# Patient Record
Sex: MALE | Race: WHITE | NOT HISPANIC OR LATINO | Employment: UNEMPLOYED | URBAN - METROPOLITAN AREA
[De-identification: names, ages, dates, MRNs, and addresses within clinical notes are randomized per-mention and may not be internally consistent; named-entity substitution may affect disease eponyms.]

---

## 2020-06-22 ENCOUNTER — OFFICE VISIT (OUTPATIENT)
Dept: CARDIOLOGY | Facility: CLINIC | Age: 70
End: 2020-06-22

## 2020-06-22 VITALS
DIASTOLIC BLOOD PRESSURE: 80 MMHG | BODY MASS INDEX: 27.06 KG/M2 | HEART RATE: 50 BPM | OXYGEN SATURATION: 98 % | SYSTOLIC BLOOD PRESSURE: 116 MMHG | RESPIRATION RATE: 16 BRPM | WEIGHT: 189 LBS | HEIGHT: 70 IN

## 2020-06-22 DIAGNOSIS — R06.09 EXERTIONAL DYSPNEA: Primary | ICD-10-CM

## 2020-06-22 PROCEDURE — 93000 ELECTROCARDIOGRAM COMPLETE: CPT | Performed by: INTERNAL MEDICINE

## 2020-06-22 PROCEDURE — 99204 OFFICE O/P NEW MOD 45 MIN: CPT | Performed by: INTERNAL MEDICINE

## 2020-06-22 RX ORDER — PANTOPRAZOLE SODIUM 40 MG/1
TABLET, DELAYED RELEASE ORAL
COMMUNITY
Start: 2020-06-13

## 2020-06-22 RX ORDER — TRAZODONE HYDROCHLORIDE 50 MG/1
TABLET ORAL
COMMUNITY
Start: 2020-06-13

## 2020-06-22 RX ORDER — RAMIPRIL 10 MG/1
CAPSULE ORAL
COMMUNITY
Start: 2020-04-04

## 2020-06-22 RX ORDER — METOPROLOL TARTRATE 50 MG/1
50 TABLET, FILM COATED ORAL 2 TIMES DAILY
COMMUNITY
Start: 2020-04-29

## 2020-06-22 RX ORDER — ATORVASTATIN CALCIUM 40 MG/1
TABLET, FILM COATED ORAL
COMMUNITY
Start: 2020-03-16

## 2020-06-22 RX ORDER — HYDROCHLOROTHIAZIDE 25 MG/1
25 TABLET ORAL DAILY
COMMUNITY

## 2020-06-22 RX ORDER — PAROXETINE HYDROCHLORIDE 40 MG/1
TABLET, FILM COATED ORAL
COMMUNITY
Start: 2020-06-13

## 2020-06-22 RX ORDER — ALPRAZOLAM 0.5 MG/1
TABLET ORAL
COMMUNITY
Start: 2020-06-03

## 2020-06-22 NOTE — PROGRESS NOTES
"        [facility]      Patient Name: Phil Gonsalves  :1950  Age: 69 y.o.  Encounter Provider:  Jose Siddiqui Jr, MD      Chief Complaint:   Chief Complaint   Patient presents with   • Coronary Artery Disease     NEW PATIENT         HPI  Phil Gonsalves is a 69 y.o. male with past medical history of coronary artery disease status post PCI in , hypertension hyperlipidemia who presents for initial evaluation of lower extremity edema.  He was initially seen by Dr. Chino in  for left arm pain which turned out to be his anginal equivalent.  He was found to have obstructive coronary artery disease and PCI performed to the \"right side\" per patient report.  He is done very well since that time and continues [place] very [place] doing a lot of walking and only recently having some issues.  He has been on diuretic therapy for many years due to lower extremity edema.  Is uncertain whether or not this is secondary to venous insufficiency but he was noted to have some renal dysfunction and was taken off of diuretics about 6 months ago.  He began to note significantly increased lower extremity edema after cessation of therapy.  He also noted some increase in exertional dyspnea when walking to the back of this form at that time.  He has since been placed back on diuretics and states that he recently had follow-up blood work which showed no renal dysfunction.  He also states that the exertional dyspnea has resolved since restarting therapy.  He denies any orthopnea or PND.  He has not been experiencing his anginal equivalent.  No dizziness, palpitations or syncope.  He is an ex-smoker who quit many years ago.  Family history was reviewed and is not pertinent to this clinic visit.      The following portions of the patient's history were reviewed and updated as appropriate: allergies, current medications, past family history, past medical history, past social history, past surgical " "history and problem list.      Review of Systems   Constitution: Negative for chills and fever.   HENT: Negative for hoarse voice and sore throat.    Eyes: Negative for double vision and photophobia.   Cardiovascular: Positive for dyspnea on exertion. Negative for chest pain, leg swelling, near-syncope, orthopnea, palpitations, paroxysmal nocturnal dyspnea and syncope.   Respiratory: Negative for cough and wheezing.    Skin: Negative for poor wound healing and rash.   Musculoskeletal: Negative for arthritis and joint swelling.   Gastrointestinal: Negative for bloating, abdominal pain, hematemesis and hematochezia.   Neurological: Negative for dizziness and focal weakness.   Psychiatric/Behavioral: Negative for depression and suicidal ideas.       OBJECTIVE:   Vital Signs  Vitals:    06/22/20 1111   BP: 116/80   Pulse: 50   Resp: 16   SpO2: 98%     Estimated body mass index is 27.12 kg/m² as calculated from the following:    Height as of this encounter: 177.8 cm (70\").    Weight as of this encounter: 85.7 kg (189 lb).    Physical Exam   Constitutional: He is oriented to person, place, and time. He appears well-developed and well-nourished.   HENT:   Head: Normocephalic and atraumatic.   Eyes: Pupils are equal, round, and reactive to light. Conjunctivae are normal.   Neck: No JVD present. No thyromegaly present.   Cardiovascular: Exam reveals no gallop and no friction rub.   No murmur heard.  Pulmonary/Chest: No respiratory distress. He exhibits no tenderness.   Abdominal: Bowel sounds are normal. He exhibits no distension.   Musculoskeletal: He exhibits no edema or tenderness.   Neurological: He is alert and oriented to person, place, and time.   Skin: No rash noted. No erythema.   Psychiatric: He has a normal mood and affect. Judgment normal.   Vitals reviewed.        ECG 12 Lead  Date/Time: 6/22/2020 11:40 AM  Performed by: Jose Siddiqui Jr., MD  Authorized by: Jose Siddiqui Jr., MD   Comparison: not compared " with previous ECG   Previous ECG: no previous ECG available  Rhythm: sinus bradycardia    Clinical impression: abnormal EKG                  ASSESSMENT:     Exertional dyspnea  Bilateral lower extremity edema  Bradycardia  CAD  Hypertension  Dyslipidemia    PLAN OF CARE:     1. Lower extremity edema -patient did have concurrent exertional dyspnea which has since resolved.  We will check an echocardiogram.  He is not having his anginal equivalent and so I do not think that further ischemic work-up is needed at this time.  Bradycardia is noted but the patient states that he is known about a low heart rate for his entire life.  Other than the slow ventricular response I see no abnormalities on EKG and the patient has no clinical complaints that would be consistent with bradyarrhythmia.  Continue hydrochlorothiazide.  2. Hypertension -patient shows me a very well controlled home blood pressure log.  Continue same.  Patient is compliant with sodium restriction.  3. Bradycardia -sinus focus.  Patient is on moderate dose of beta-blocker but seems unaffected by this.  We will continue to monitor clinical progress.  4. Dyslipidemia -continue statin.  5. CAD -continue aspirin and statin.    Return to clinic 3 months           Discharge Medications           Accurate as of June 22, 2020 11:37 AM. If you have any questions, ask your nurse or doctor.               Continue These Medications      Instructions Start Date   ALPRAZolam 0.5 MG tablet  Commonly known as:  XANAX   No dose, route, or frequency recorded.      aspirin 325 MG EC tablet   325 mg, Oral, Daily      atorvastatin 40 MG tablet  Commonly known as:  LIPITOR   No dose, route, or frequency recorded.      hydroCHLOROthiazide 25 MG tablet  Commonly known as:  HYDRODIURIL   25 mg, Oral, Daily      metoprolol tartrate 50 MG tablet  Commonly known as:  LOPRESSOR   50 tablets, Oral, 2 times daily      pantoprazole 40 MG EC tablet  Commonly known as:  PROTONIX   No dose,  route, or frequency recorded.      PARoxetine 40 MG tablet  Commonly known as:  PAXIL   No dose, route, or frequency recorded.      ramipril 10 MG capsule  Commonly known as:  ALTACE   No dose, route, or frequency recorded.      traZODone 50 MG tablet  Commonly known as:  DESYREL   No dose, route, or frequency recorded.             Thank you for allowing me to participate in the care of your patient,      Sincerely,   Jose Siddiqui Jr, MD  [facility]  06/22/20  11:37

## 2020-07-06 ENCOUNTER — HOSPITAL ENCOUNTER (OUTPATIENT)
Dept: CARDIOLOGY | Facility: HOSPITAL | Age: 70
Discharge: HOME | End: 2020-07-06
Admitting: INTERNAL MEDICINE

## 2020-07-06 VITALS
HEIGHT: 70 IN | WEIGHT: 180 LBS | HEART RATE: 48 BPM | BODY MASS INDEX: 25.77 KG/M2 | SYSTOLIC BLOOD PRESSURE: 116 MMHG | DIASTOLIC BLOOD PRESSURE: 80 MMHG

## 2020-07-06 DIAGNOSIS — R06.09 EXERTIONAL DYSPNEA: ICD-10-CM

## 2020-07-06 PROCEDURE — 93306 TTE W/DOPPLER COMPLETE: CPT

## 2020-07-06 PROCEDURE — 93306 TTE W/DOPPLER COMPLETE: CPT | Performed by: INTERNAL MEDICINE

## 2020-07-07 ENCOUNTER — TELEPHONE (OUTPATIENT)
Dept: CARDIOLOGY | Facility: CLINIC | Age: 70
End: 2020-07-07

## 2020-07-07 LAB
AORTIC ARCH: 2.2 CM
ASCENDING AORTA: 3.5 CM
BH CV ECHO MEAS - ACS: 1.8 CM
BH CV ECHO MEAS - AO MAX PG (FULL): 4.6 MMHG
BH CV ECHO MEAS - AO MAX PG: 6.4 MMHG
BH CV ECHO MEAS - AO MEAN PG (FULL): 3 MMHG
BH CV ECHO MEAS - AO MEAN PG: 4 MMHG
BH CV ECHO MEAS - AO V2 MAX: 126 CM/SEC
BH CV ECHO MEAS - AO V2 MEAN: 94.3 CM/SEC
BH CV ECHO MEAS - AO V2 VTI: 35.8 CM
BH CV ECHO MEAS - ASC AORTA: 3.5 CM
BH CV ECHO MEAS - AVA(I,A): 1.5 CM^2
BH CV ECHO MEAS - AVA(I,D): 1.5 CM^2
BH CV ECHO MEAS - AVA(V,A): 1.6 CM^2
BH CV ECHO MEAS - AVA(V,D): 1.6 CM^2
BH CV ECHO MEAS - BSA(HAYCOCK): 2 M^2
BH CV ECHO MEAS - BSA: 2 M^2
BH CV ECHO MEAS - BZI_BMI: 25.8 KILOGRAMS/M^2
BH CV ECHO MEAS - BZI_METRIC_HEIGHT: 177.8 CM
BH CV ECHO MEAS - BZI_METRIC_WEIGHT: 81.6 KG
BH CV ECHO MEAS - EDV(MOD-SP2): 77 ML
BH CV ECHO MEAS - EDV(MOD-SP4): 81 ML
BH CV ECHO MEAS - EDV(TEICH): 97.3 ML
BH CV ECHO MEAS - EF(CUBED): 66.3 %
BH CV ECHO MEAS - EF(MOD-BP): 61 %
BH CV ECHO MEAS - EF(MOD-SP2): 55.8 %
BH CV ECHO MEAS - EF(MOD-SP4): 65.4 %
BH CV ECHO MEAS - EF(TEICH): 57.9 %
BH CV ECHO MEAS - ESV(MOD-SP2): 34 ML
BH CV ECHO MEAS - ESV(MOD-SP4): 28 ML
BH CV ECHO MEAS - ESV(TEICH): 41 ML
BH CV ECHO MEAS - FS: 30.4 %
BH CV ECHO MEAS - IVS/LVPW: 1.1
BH CV ECHO MEAS - IVSD: 1.1 CM
BH CV ECHO MEAS - LAT PEAK E' VEL: 7.9 CM/SEC
BH CV ECHO MEAS - LV DIASTOLIC VOL/BSA (35-75): 40.6 ML/M^2
BH CV ECHO MEAS - LV MASS(C)D: 169.9 GRAMS
BH CV ECHO MEAS - LV MASS(C)DI: 85.1 GRAMS/M^2
BH CV ECHO MEAS - LV MAX PG: 1.7 MMHG
BH CV ECHO MEAS - LV MEAN PG: 1 MMHG
BH CV ECHO MEAS - LV SYSTOLIC VOL/BSA (12-30): 14 ML/M^2
BH CV ECHO MEAS - LV V1 MAX: 65.5 CM/SEC
BH CV ECHO MEAS - LV V1 MEAN: 43.6 CM/SEC
BH CV ECHO MEAS - LV V1 VTI: 16.8 CM
BH CV ECHO MEAS - LVIDD: 4.6 CM
BH CV ECHO MEAS - LVIDS: 3.2 CM
BH CV ECHO MEAS - LVLD AP2: 8 CM
BH CV ECHO MEAS - LVLD AP4: 8 CM
BH CV ECHO MEAS - LVLS AP2: 7 CM
BH CV ECHO MEAS - LVLS AP4: 6.4 CM
BH CV ECHO MEAS - LVOT AREA (M): 3.1 CM^2
BH CV ECHO MEAS - LVOT AREA: 3.1 CM^2
BH CV ECHO MEAS - LVOT DIAM: 2 CM
BH CV ECHO MEAS - LVPWD: 1 CM
BH CV ECHO MEAS - MED PEAK E' VEL: 7.3 CM/SEC
BH CV ECHO MEAS - MR MAX PG: 82.4 MMHG
BH CV ECHO MEAS - MR MAX VEL: 454 CM/SEC
BH CV ECHO MEAS - MV A DUR: 0.13 SEC
BH CV ECHO MEAS - MV A MAX VEL: 76.4 CM/SEC
BH CV ECHO MEAS - MV DEC SLOPE: 262 CM/SEC^2
BH CV ECHO MEAS - MV DEC TIME: 0.25 SEC
BH CV ECHO MEAS - MV E MAX VEL: 69.8 CM/SEC
BH CV ECHO MEAS - MV E/A: 0.91
BH CV ECHO MEAS - MV MAX PG: 4.8 MMHG
BH CV ECHO MEAS - MV MEAN PG: 1 MMHG
BH CV ECHO MEAS - MV P1/2T MAX VEL: 68.9 CM/SEC
BH CV ECHO MEAS - MV P1/2T: 77 MSEC
BH CV ECHO MEAS - MV V2 MAX: 109 CM/SEC
BH CV ECHO MEAS - MV V2 MEAN: 43 CM/SEC
BH CV ECHO MEAS - MV V2 VTI: 37.3 CM
BH CV ECHO MEAS - MVA P1/2T LCG: 3.2 CM^2
BH CV ECHO MEAS - MVA(P1/2T): 2.9 CM^2
BH CV ECHO MEAS - MVA(VTI): 1.4 CM^2
BH CV ECHO MEAS - PA MAX PG (FULL): 2.5 MMHG
BH CV ECHO MEAS - PA MAX PG: 3.2 MMHG
BH CV ECHO MEAS - PA V2 MAX: 89.6 CM/SEC
BH CV ECHO MEAS - PULM A REVS DUR: 0.12 SEC
BH CV ECHO MEAS - PULM A REVS VEL: 23.4 CM/SEC
BH CV ECHO MEAS - PULM DIAS VEL: 25.7 CM/SEC
BH CV ECHO MEAS - PULM S/D: 1.4
BH CV ECHO MEAS - PULM SYS VEL: 37.2 CM/SEC
BH CV ECHO MEAS - PVA(V,A): 1.8 CM^2
BH CV ECHO MEAS - PVA(V,D): 1.8 CM^2
BH CV ECHO MEAS - QP/QS: 0.95
BH CV ECHO MEAS - RV BASE (<4.1) - OBSOLETE: 3.3 CM
BH CV ECHO MEAS - RV LENGTH (<8.5) - OBSOLETE: 7 CM
BH CV ECHO MEAS - RV MAX PG: 0.7 MMHG
BH CV ECHO MEAS - RV MEAN PG: 0 MMHG
BH CV ECHO MEAS - RV V1 MAX: 41.8 CM/SEC
BH CV ECHO MEAS - RV V1 MEAN: 30.8 CM/SEC
BH CV ECHO MEAS - RV V1 VTI: 13.2 CM
BH CV ECHO MEAS - RVOT AREA: 3.8 CM^2
BH CV ECHO MEAS - RVOT DIAM: 2.2 CM
BH CV ECHO MEAS - SI(CUBED): 32.3 ML/M^2
BH CV ECHO MEAS - SI(LVOT): 26.4 ML/M^2
BH CV ECHO MEAS - SI(MOD-SP2): 21.5 ML/M^2
BH CV ECHO MEAS - SI(MOD-SP4): 26.6 ML/M^2
BH CV ECHO MEAS - SI(TEICH): 28.2 ML/M^2
BH CV ECHO MEAS - SUP REN AO DIAM: 2 CM
BH CV ECHO MEAS - SV(CUBED): 64.6 ML
BH CV ECHO MEAS - SV(LVOT): 52.8 ML
BH CV ECHO MEAS - SV(MOD-SP2): 43 ML
BH CV ECHO MEAS - SV(MOD-SP4): 53 ML
BH CV ECHO MEAS - SV(RVOT): 50.2 ML
BH CV ECHO MEAS - SV(TEICH): 56.4 ML
BH CV ECHO MEAS - TAPSE (>1.6): 2.3 CM2
BH CV ECHO MEASUREMENTS AVERAGE E/E' RATIO: 9.18
BH CV XLRA - RV BASE: 3.3 CM
BH CV XLRA - RV LENGTH: 7 CM
BH CV XLRA - RV MID: 3.2 CM
BH CV XLRA - TDI S': 12 CM/SEC
LEFT ATRIUM VOLUME INDEX: 28 ML/M2
MAXIMAL PREDICTED HEART RATE: 151 BPM
SINUS: 3.6 CM
STJ: 2.9 CM
STRESS TARGET HR: 128 BPM

## 2020-07-07 NOTE — PROGRESS NOTES
Please let patient know that he has normal heart pump function and that I will see him at next scheduled clinic visit.

## 2020-07-07 NOTE — TELEPHONE ENCOUNTER
----- Message from Jose Siddiqui Jr., MD sent at 7/7/2020 10:12 AM EDT -----  Please let patient know that he has normal heart pump function and that I will see him at next scheduled clinic visit.

## 2021-03-09 ENCOUNTER — TELEPHONE (OUTPATIENT)
Dept: ONCOLOGY | Facility: CLINIC | Age: 71
End: 2021-03-09

## 2021-03-09 ENCOUNTER — CONSULT (OUTPATIENT)
Dept: ONCOLOGY | Facility: CLINIC | Age: 71
End: 2021-03-09

## 2021-03-09 ENCOUNTER — LAB (OUTPATIENT)
Dept: OTHER | Facility: HOSPITAL | Age: 71
End: 2021-03-09

## 2021-03-09 VITALS
TEMPERATURE: 97.3 F | OXYGEN SATURATION: 100 % | WEIGHT: 190.4 LBS | BODY MASS INDEX: 28.85 KG/M2 | DIASTOLIC BLOOD PRESSURE: 68 MMHG | RESPIRATION RATE: 16 BRPM | SYSTOLIC BLOOD PRESSURE: 106 MMHG | HEART RATE: 53 BPM | HEIGHT: 68 IN

## 2021-03-09 DIAGNOSIS — D64.9 ANEMIA, UNSPECIFIED TYPE: ICD-10-CM

## 2021-03-09 DIAGNOSIS — I82.401 ACUTE EMBOLISM AND THROMBOSIS OF UNSPECIFIED DEEP VEINS OF RIGHT LOWER EXTREMITY (HCC): Primary | ICD-10-CM

## 2021-03-09 DIAGNOSIS — I82.431 ACUTE DEEP VEIN THROMBOSIS (DVT) OF POPLITEAL VEIN OF RIGHT LOWER EXTREMITY (HCC): Primary | ICD-10-CM

## 2021-03-09 LAB
ABO GROUP BLD: NORMAL
ALBUMIN SERPL-MCNC: 4.4 G/DL (ref 3.5–5.2)
ALBUMIN/GLOB SERPL: 1.6 G/DL
ALP SERPL-CCNC: 79 U/L (ref 39–117)
ALT SERPL W P-5'-P-CCNC: 17 U/L (ref 1–41)
ANION GAP SERPL CALCULATED.3IONS-SCNC: 4.2 MMOL/L (ref 5–15)
AST SERPL-CCNC: 19 U/L (ref 1–40)
BASOPHILS # BLD AUTO: 0.03 10*3/MM3 (ref 0–0.2)
BASOPHILS NFR BLD AUTO: 0.5 % (ref 0–1.5)
BILIRUB SERPL-MCNC: 0.7 MG/DL (ref 0–1.2)
BUN SERPL-MCNC: 36 MG/DL (ref 8–23)
BUN/CREAT SERPL: 25.9 (ref 7–25)
CALCIUM SPEC-SCNC: 9.8 MG/DL (ref 8.6–10.5)
CHLORIDE SERPL-SCNC: 100 MMOL/L (ref 98–107)
CO2 SERPL-SCNC: 30.8 MMOL/L (ref 22–29)
CREAT SERPL-MCNC: 1.39 MG/DL (ref 0.76–1.27)
DAT POLY-SP REAG RBC QL: NEGATIVE
DEPRECATED RDW RBC AUTO: 46.1 FL (ref 37–54)
EOSINOPHIL # BLD AUTO: 0.17 10*3/MM3 (ref 0–0.4)
EOSINOPHIL NFR BLD AUTO: 2.6 % (ref 0.3–6.2)
ERYTHROCYTE [DISTWIDTH] IN BLOOD BY AUTOMATED COUNT: 13.8 % (ref 12.3–15.4)
FERRITIN SERPL-MCNC: 78.1 NG/ML (ref 30–400)
GFR SERPL CREATININE-BSD FRML MDRD: 51 ML/MIN/1.73
GLOBULIN UR ELPH-MCNC: 2.7 GM/DL
GLUCOSE SERPL-MCNC: 107 MG/DL (ref 65–99)
HAPTOGLOB SERPL-MCNC: 131 MG/DL (ref 30–200)
HCT VFR BLD AUTO: 36.9 % (ref 37.5–51)
HGB BLD-MCNC: 12.5 G/DL (ref 13–17.7)
HGB RETIC QN AUTO: 34.3 PG (ref 29.8–36.1)
IMM GRANULOCYTES # BLD AUTO: 0.03 10*3/MM3 (ref 0–0.05)
IMM GRANULOCYTES NFR BLD AUTO: 0.5 % (ref 0–0.5)
IMM RETICS NFR: 14.2 % (ref 3–15.8)
IRON 24H UR-MRATE: 112 MCG/DL (ref 59–158)
IRON SATN MFR SERPL: 28 % (ref 20–50)
LDH SERPL-CCNC: 223 U/L (ref 135–225)
LYMPHOCYTES # BLD AUTO: 2.07 10*3/MM3 (ref 0.7–3.1)
LYMPHOCYTES NFR BLD AUTO: 31.1 % (ref 19.6–45.3)
MCH RBC QN AUTO: 30.7 PG (ref 26.6–33)
MCHC RBC AUTO-ENTMCNC: 33.9 G/DL (ref 31.5–35.7)
MCV RBC AUTO: 90.7 FL (ref 79–97)
MONOCYTES # BLD AUTO: 0.86 10*3/MM3 (ref 0.1–0.9)
MONOCYTES NFR BLD AUTO: 12.9 % (ref 5–12)
NEUTROPHILS NFR BLD AUTO: 3.49 10*3/MM3 (ref 1.7–7)
NEUTROPHILS NFR BLD AUTO: 52.4 % (ref 42.7–76)
NRBC BLD AUTO-RTO: 0 /100 WBC (ref 0–0.2)
PLATELET # BLD AUTO: 190 10*3/MM3 (ref 140–450)
PMV BLD AUTO: 10.1 FL (ref 6–12)
POTASSIUM SERPL-SCNC: 4.2 MMOL/L (ref 3.5–5.2)
PROT SERPL-MCNC: 7.1 G/DL (ref 6–8.5)
RBC # BLD AUTO: 4.07 10*6/MM3 (ref 4.14–5.8)
RETICS # AUTO: 0.06 10*6/MM3 (ref 0.02–0.13)
RETICS/RBC NFR AUTO: 1.55 % (ref 0.7–1.9)
RH BLD: POSITIVE
SODIUM SERPL-SCNC: 135 MMOL/L (ref 136–145)
TIBC SERPL-MCNC: 399 MCG/DL (ref 298–536)
TRANSFERRIN SERPL-MCNC: 268 MG/DL (ref 200–360)
VIT B12 BLD-MCNC: 1160 PG/ML (ref 211–946)
WBC # BLD AUTO: 6.65 10*3/MM3 (ref 3.4–10.8)

## 2021-03-09 PROCEDURE — 36415 COLL VENOUS BLD VENIPUNCTURE: CPT

## 2021-03-09 PROCEDURE — 80053 COMPREHEN METABOLIC PANEL: CPT | Performed by: INTERNAL MEDICINE

## 2021-03-09 PROCEDURE — 86900 BLOOD TYPING SEROLOGIC ABO: CPT

## 2021-03-09 PROCEDURE — 82607 VITAMIN B-12: CPT | Performed by: INTERNAL MEDICINE

## 2021-03-09 PROCEDURE — 83615 LACTATE (LD) (LDH) ENZYME: CPT | Performed by: INTERNAL MEDICINE

## 2021-03-09 PROCEDURE — 82728 ASSAY OF FERRITIN: CPT | Performed by: INTERNAL MEDICINE

## 2021-03-09 PROCEDURE — 85046 RETICYTE/HGB CONCENTRATE: CPT | Performed by: INTERNAL MEDICINE

## 2021-03-09 PROCEDURE — 99204 OFFICE O/P NEW MOD 45 MIN: CPT | Performed by: INTERNAL MEDICINE

## 2021-03-09 PROCEDURE — 85306 CLOT INHIBIT PROT S FREE: CPT | Performed by: INTERNAL MEDICINE

## 2021-03-09 PROCEDURE — 85014 HEMATOCRIT: CPT | Performed by: INTERNAL MEDICINE

## 2021-03-09 PROCEDURE — 86880 COOMBS TEST DIRECT: CPT | Performed by: INTERNAL MEDICINE

## 2021-03-09 PROCEDURE — 85025 COMPLETE CBC W/AUTO DIFF WBC: CPT | Performed by: INTERNAL MEDICINE

## 2021-03-09 PROCEDURE — 84466 ASSAY OF TRANSFERRIN: CPT | Performed by: INTERNAL MEDICINE

## 2021-03-09 PROCEDURE — 82747 ASSAY OF FOLIC ACID RBC: CPT | Performed by: INTERNAL MEDICINE

## 2021-03-09 PROCEDURE — 83540 ASSAY OF IRON: CPT | Performed by: INTERNAL MEDICINE

## 2021-03-09 PROCEDURE — 83010 ASSAY OF HAPTOGLOBIN QUANT: CPT | Performed by: INTERNAL MEDICINE

## 2021-03-09 PROCEDURE — 86901 BLOOD TYPING SEROLOGIC RH(D): CPT

## 2021-03-09 RX ORDER — RIVAROXABAN 20 MG/1
TABLET, FILM COATED ORAL
COMMUNITY
Start: 2021-02-09

## 2021-03-09 NOTE — PROGRESS NOTES
.     REASON FOR CONSULTATION:   PE  Provide an opinion on any further workup or treatment                             REQUESTING PHYSICIAN: Joel Hanson, *  RECORDS OBTAINED:  Records of the patient's history including those obtained from the referring provider were reviewed and summarized in detail.    HISTORY OF PRESENT ILLNESS:  The patient is a 70 y.o. year old male  who is here for follow-up with the above-mentioned history.    Last cardiology note reviewed 6/22/2020.  That was a new patient consult for coronary artery disease and lower extremity edema.    Reviewed last note from PCP, Dr. Joel Haynes, 2/9/2021.  Patient presented with ankle swelling and pain, left ankle, x1 week.  No known preceding events.    Right popliteal, peroneal, posterior tibial DVT, 2/9/2021, Tomas.  On 2/15/2021, unremarkable: Protein C activity, protein S activity, von Willebrand antigen, prothrombin gene mutation, Antithrombin, fibrinogen, lupus anticoagulant, factor V Leiden gene mutation, beta-2 glycoprotein antibodies, anticardiolipin antibodies    Denies any prior history of clotting other than a superficial thrombophlebitis in the contralateral leg back in 2013.  No family history of clotting.    States he is up-to-date on colonoscopy's.  States he has not been having prostate exams.  I told him to discuss with his PCP the pros and cons of prostate cancer screening.  I told him malignancies can be associated with clotting.    No significant bleeding problems since being on Xarelto were previously.    States he has not been doing much this winter with the pandemic.  States he has often been sitting for 2 or 3 hours at a time.  States he realizes this is a risk factor for clotting and plans to be more mobile.  His goal is to get off anticoagulation.  Already on aspirin 325 mg daily.  Has a coronary stent.    Past Medical History:   Diagnosis Date   • Anxiety    • Chronic kidney disease    • Coronary artery  disease    • Depression    • Headache    • History of anemia    • History of bradycardia    • Hyperglycemia    • Hyperlipidemia    • Hypertension    • Insomnia    • Malaise      Past Surgical History:   Procedure Laterality Date   • CARDIAC CATHETERIZATION     • CARDIAC SURGERY      PCI   • CAROTID STENT  2007    X 1       MEDICATIONS    Current Outpatient Medications:   •  ALPRAZolam (XANAX) 0.5 MG tablet, , Disp: , Rfl:   •  aspirin 325 MG EC tablet, Take 325 mg by mouth Daily., Disp: , Rfl:   •  atorvastatin (LIPITOR) 40 MG tablet, , Disp: , Rfl:   •  hydroCHLOROthiazide (HYDRODIURIL) 25 MG tablet, Take 25 mg by mouth Daily., Disp: , Rfl:   •  metoprolol tartrate (LOPRESSOR) 50 MG tablet, Take 50 tablets by mouth 2 (two) times a day., Disp: , Rfl:   •  pantoprazole (PROTONIX) 40 MG EC tablet, , Disp: , Rfl:   •  PARoxetine (PAXIL) 40 MG tablet, , Disp: , Rfl:   •  ramipril (ALTACE) 10 MG capsule, , Disp: , Rfl:   •  traZODone (DESYREL) 50 MG tablet, , Disp: , Rfl:   •  Xarelto 20 MG tablet, , Disp: , Rfl:     ALLERGIES:   No Known Allergies    SOCIAL HISTORY:       Social History     Socioeconomic History   • Marital status:      Spouse name: Sandra   • Number of children: Not on file   • Years of education: Not on file   • Highest education level: Not on file   Tobacco Use   • Smoking status: Former Smoker     Packs/day: 1.50     Years: 20.00     Pack years: 30.00     Types: Cigarettes     Quit date:      Years since quittin.1   • Smokeless tobacco: Never Used   Substance and Sexual Activity   • Alcohol use: Never   • Sexual activity: Defer         FAMILY HISTORY:  Family History   Problem Relation Age of Onset   • Heart attack Father    • Heart disease Father    • Hyperlipidemia Father    • Hypertension Father    • Heart disease Sister    • Hyperlipidemia Sister    • Hypertension Sister    • Heart disease Brother    • Hyperlipidemia Brother    • Hypertension Brother        REVIEW OF  "SYSTEMS:  Review of Systems   Constitutional: Negative for activity change.   HENT: Negative for nosebleeds and trouble swallowing.    Respiratory: Negative for shortness of breath and wheezing.    Cardiovascular: Negative for chest pain and palpitations.   Gastrointestinal: Negative for constipation, diarrhea and nausea.   Genitourinary: Negative for dysuria and hematuria.   Musculoskeletal: Negative for arthralgias and myalgias.   Neurological: Negative for seizures and syncope.   Hematological: Negative for adenopathy. Does not bruise/bleed easily.   Psychiatric/Behavioral: Negative for confusion.              Vitals:    03/09/21 1334   BP: 106/68   Pulse: 53   Resp: 16   Temp: 97.3 °F (36.3 °C)   TempSrc: Skin   SpO2: 100%   Weight: 86.4 kg (190 lb 6.4 oz)  Comment: new weight   Height: 173.5 cm (68.31\")  Comment: new height   PainSc: 0-No pain     No flowsheet data found.   PHYSICAL EXAM:        CONSTITUTIONAL:  Vital signs reviewed.  No distress, looks comfortable.  EYES:  Conjunctiva and lids unremarkable.  PERRLA  EARS,NOSE,MOUTH,THROAT:  Ears and nose appear unremarkable.  Lips, teeth, gums appear unremarkable.  RESPIRATORY:  Normal respiratory effort.  Lungs clear to auscultation bilaterally.  CARDIOVASCULAR:  Normal S1, S2.  No murmurs rubs or gallops.  No significant lower extremity edema.  GASTROINTESTINAL: Abdomen appears unremarkable.  Nontender.  No hepatomegaly.  No splenomegaly.  LYMPHATIC:  No cervical, supraclavicular, axillary lymphadenopathy.  SKIN:  Warm.  No rashes.  PSYCHIATRIC:  Normal judgment and insight.  Normal mood and affect.         RECENT LABS:        WBC   Date Value Ref Range Status   03/09/2021 6.65 3.40 - 10.80 10*3/mm3 Final   01/16/2016 5.95 4.50 - 10.70 K/Cumm Final     Hemoglobin   Date Value Ref Range Status   03/09/2021 12.5 (L) 13.0 - 17.7 g/dL Final   01/16/2016 13.5 (L) 13.7 - 17.6 g/dL Final     Platelets   Date Value Ref Range Status   03/09/2021 190 140 - 450 " 10*3/mm3 Final   01/16/2016 187 140 - 500 K/Cumm Final       Assessment/Plan   Acute deep vein thrombosis (DVT) of popliteal vein of right lower extremity (CMS/HCC)  - Protein S Antigen, Free  - Ferritin  - Iron Profile  - Retic With IRF & RET-He  - Vitamin B12  - Folate RBC  - Comprehensive Metabolic Panel  - Lactate Dehydrogenase  - Haptoglobin  - Direct Antiglobulin Test (Direct Nini)    Anemia, unspecified type  - Protein S Antigen, Free  - Ferritin  - Iron Profile  - Retic With IRF & RET-He  - Vitamin B12  - Folate RBC  - Comprehensive Metabolic Panel  - Lactate Dehydrogenase  - Haptoglobin  - Direct Antiglobulin Test (Direct Nini)        Phil Gonsalves   *Right popliteal, peroneal, posterior tibial DVT, 2/9/2021, [facility].    *Risk factors for clotting:  · On 2/15/2021, unremarkable: Protein C activity, protein S activity, prothrombin gene mutation, Antithrombin, fibrinogen, lupus anticoagulant, factor V Leiden gene mutation, beta-2 glycoprotein antibodies, anticardiolipin antibodies  · Was sitting for 2 to 3 hours at a time.  States this was due to combination of wintertime and viral pandemic.  · No recent trips or surgeries or hospitalizations before the DVT  · Quit smoking 2005  · No family history or prior personal history of DVT or PE.    *Anticoagulation  · Xarelto  · His goal is to get off Xarelto after 6 months of anticoagulation, if pending protein S free is unremarkable.  · (Already on aspirin 325 mg daily due to coronary stent)    *Anemia  Check anemia labs    *Cancer screening.  · Colonoscopy 2016.  States he was told next is due 10 years later.  · States he has not been having prostate cancer screening.  · I told him sometimes malignancies are associated with clotting.  I told him to discuss the pros and cons of prostate cancer screening with PCP    Plan  Anemia labs  Protein S free  MD CBC 2 to 4 weeks    Addendum  Please call him and tell him his kidney function is slightly abnormal.  Tell  him to focus on drinking plenty of noncaffeinated beverages and ask him to have a repeat BMP with us on Friday.  (I did not have enough old labs to establish his baseline).  Please send this lab to his PCP office    Note  Xarelto is considered reasonable if creatinine clearance is above 30.

## 2021-03-09 NOTE — TELEPHONE ENCOUNTER
Called the patient to let him know that per Dr. Mckeon his creatinine was slightly elevated and that he wanted him to come back in Friday for a stat BMP. Patient was agreeable and asked for 2pm. Scheduling was notified of the patient request. Lab was sent to his PCP.

## 2021-03-11 LAB
FOLATE BLD-MCNC: 583 NG/ML
FOLATE RBC-MCNC: 1576 NG/ML
HCT VFR BLD AUTO: 37 % (ref 37.5–51)

## 2021-03-12 ENCOUNTER — DOCUMENTATION (OUTPATIENT)
Dept: ONCOLOGY | Facility: CLINIC | Age: 71
End: 2021-03-12

## 2021-03-12 ENCOUNTER — CLINICAL SUPPORT (OUTPATIENT)
Dept: ONCOLOGY | Facility: HOSPITAL | Age: 71
End: 2021-03-12

## 2021-03-12 ENCOUNTER — LAB (OUTPATIENT)
Dept: OTHER | Facility: HOSPITAL | Age: 71
End: 2021-03-12

## 2021-03-12 VITALS
RESPIRATION RATE: 18 BRPM | WEIGHT: 190 LBS | HEIGHT: 70 IN | OXYGEN SATURATION: 100 % | SYSTOLIC BLOOD PRESSURE: 108 MMHG | TEMPERATURE: 97.4 F | DIASTOLIC BLOOD PRESSURE: 70 MMHG | BODY MASS INDEX: 27.2 KG/M2 | HEART RATE: 46 BPM

## 2021-03-12 DIAGNOSIS — D64.9 ANEMIA, UNSPECIFIED TYPE: ICD-10-CM

## 2021-03-12 LAB
ANION GAP SERPL CALCULATED.3IONS-SCNC: 3.7 MMOL/L (ref 5–15)
BUN SERPL-MCNC: 28 MG/DL (ref 8–23)
BUN/CREAT SERPL: 21.5 (ref 7–25)
CALCIUM SPEC-SCNC: 9.1 MG/DL (ref 8.6–10.5)
CHLORIDE SERPL-SCNC: 99 MMOL/L (ref 98–107)
CO2 SERPL-SCNC: 32.3 MMOL/L (ref 22–29)
CREAT SERPL-MCNC: 1.3 MG/DL (ref 0.76–1.27)
GFR SERPL CREATININE-BSD FRML MDRD: 55 ML/MIN/1.73
GLUCOSE SERPL-MCNC: 103 MG/DL (ref 65–99)
POTASSIUM SERPL-SCNC: 4.1 MMOL/L (ref 3.5–5.2)
PROT S FREE PPP-ACNC: 94 % (ref 49–138)
SODIUM SERPL-SCNC: 135 MMOL/L (ref 136–145)

## 2021-03-12 PROCEDURE — 80048 BASIC METABOLIC PNL TOTAL CA: CPT | Performed by: INTERNAL MEDICINE

## 2021-03-12 PROCEDURE — 36415 COLL VENOUS BLD VENIPUNCTURE: CPT

## 2021-03-12 PROCEDURE — G0463 HOSPITAL OUTPT CLINIC VISIT: HCPCS

## 2021-03-12 NOTE — NURSING NOTE
Lab Results   Component Value Date    GLUCOSE 103 (H) 03/12/2021    CALCIUM 9.1 03/12/2021     (L) 03/12/2021    K 4.1 03/12/2021    CO2 32.3 (H) 03/12/2021    CL 99 03/12/2021    BUN 28 (H) 03/12/2021    CREATININE 1.30 (H) 03/12/2021    EGFRIFNONA 55 (L) 03/12/2021    BCR 21.5 03/12/2021    ANIONGAP 3.7 (L) 03/12/2021     Patient here today for BMP lab review per Dr. Mckeon's orders. Patient with no complaints or concerns today. Cr 1.3, reviewed with Dr. Mckeon. Per MD patient to follow up with PCP regarding Cr. Reviewed lab results and MDs orders with patient who verbalized understanding. Follow up appointments reviewed with patient. Pt instructed to contact office for any questions or concerns, understanding verbalized.

## 2021-03-12 NOTE — PROGRESS NOTES
Creatinine a little better, 1.3.  I asked the nurses to send this to patient's PCP and to tell the patient about the slight improvement but the fact that it remains slightly abnormal and that he should discuss with his PCP.  No other recent labs for comparison other than the creatinine that prompted this repeat creatinine.

## 2021-03-24 NOTE — PROGRESS NOTES
.     REASON FOR FOLLOWUP :   PE and anemia    HISTORY OF PRESENT ILLNESS:  The patient is a 70 y.o. year old male  who is here for follow-up with the above-mentioned history.    No new problems since last visit.  Denies  chest pain, SOA.  Denies bleeding other than left-sided epistaxis which has been persistent for 3 to 4 months    Past Medical History:   Diagnosis Date   • Anxiety    • Chronic kidney disease    • Coronary artery disease    • Depression    • Headache    • History of anemia    • History of bradycardia    • Hyperglycemia    • Hyperlipidemia    • Hypertension    • Insomnia    • Malaise      Past Surgical History:   Procedure Laterality Date   • CARDIAC CATHETERIZATION     • CARDIAC SURGERY      PCI   • CAROTID STENT  2007    X 1       MEDICATIONS    Current Outpatient Medications:   •  ALPRAZolam (XANAX) 0.5 MG tablet, , Disp: , Rfl:   •  aspirin 325 MG EC tablet, Take 325 mg by mouth Daily., Disp: , Rfl:   •  atorvastatin (LIPITOR) 40 MG tablet, , Disp: , Rfl:   •  hydroCHLOROthiazide (HYDRODIURIL) 25 MG tablet, Take 25 mg by mouth Daily., Disp: , Rfl:   •  metoprolol tartrate (LOPRESSOR) 50 MG tablet, Take 50 tablets by mouth 2 (two) times a day., Disp: , Rfl:   •  pantoprazole (PROTONIX) 40 MG EC tablet, , Disp: , Rfl:   •  PARoxetine (PAXIL) 40 MG tablet, , Disp: , Rfl:   •  ramipril (ALTACE) 10 MG capsule, , Disp: , Rfl:   •  traZODone (DESYREL) 50 MG tablet, , Disp: , Rfl:   •  Xarelto 20 MG tablet, , Disp: , Rfl:     ALLERGIES:   No Known Allergies    SOCIAL HISTORY:       Social History     Socioeconomic History   • Marital status:      Spouse name: Sandra   • Number of children: Not on file   • Years of education: Not on file   • Highest education level: Not on file   Tobacco Use   • Smoking status: Former Smoker     Packs/day: 1.50     Years: 20.00     Pack years: 30.00     Types: Cigarettes     Quit date:      Years since quittin.2   • Smokeless tobacco: Never Used  "  Substance and Sexual Activity   • Alcohol use: Never   • Sexual activity: Defer         FAMILY HISTORY:  Family History   Problem Relation Age of Onset   • Heart attack Father    • Heart disease Father    • Hyperlipidemia Father    • Hypertension Father    • Heart disease Sister    • Hyperlipidemia Sister    • Hypertension Sister    • Heart disease Brother    • Hyperlipidemia Brother    • Hypertension Brother        REVIEW OF SYSTEMS:  Review of Systems   Constitutional: Negative for activity change.   HENT: Positive for nosebleeds. Negative for trouble swallowing.    Respiratory: Negative for shortness of breath and wheezing.    Cardiovascular: Negative for chest pain and palpitations.   Gastrointestinal: Negative for constipation, diarrhea and nausea.   Genitourinary: Negative for dysuria and hematuria.   Musculoskeletal: Negative for arthralgias and myalgias.   Neurological: Negative for seizures and syncope.   Hematological: Negative for adenopathy. Does not bruise/bleed easily.   Psychiatric/Behavioral: Negative for confusion.              Vitals:    03/25/21 1505   BP: 112/71   Pulse: 60   Resp: 16   Temp: 97.7 °F (36.5 °C)   SpO2: 94%   Weight: 86.7 kg (191 lb 1.6 oz)   Height: 173.5 cm (68.31\")   PainSc: 0-No pain     Current Status 3/25/2021   ECOG score 0      PHYSICAL EXAM:        CONSTITUTIONAL:  Vital signs reviewed.  No distress, looks comfortable.  EYES:  Conjunctiva and lids unremarkable.  PERRLA  EARS,NOSE,MOUTH,THROAT:  Ears and nose appear unremarkable.  Lips, teeth, gums appear unremarkable.  RESPIRATORY:  Normal respiratory effort.  Lungs clear to auscultation bilaterally.  CARDIOVASCULAR:  Normal S1, S2.  No murmurs rubs or gallops.  No significant lower extremity edema.  GASTROINTESTINAL: Abdomen appears unremarkable.  Nontender.  No hepatomegaly.  No splenomegaly.  LYMPHATIC:  No cervical, supraclavicular, axillary lymphadenopathy.  SKIN:  Warm.  No rashes.  PSYCHIATRIC:  Normal judgment " and insight.  Normal mood and affect.         RECENT LABS:        WBC   Date Value Ref Range Status   03/25/2021 6.12 3.40 - 10.80 10*3/mm3 Final   03/09/2021 6.65 3.40 - 10.80 10*3/mm3 Final   01/16/2016 5.95 4.50 - 10.70 K/Cumm Final     Hemoglobin   Date Value Ref Range Status   03/25/2021 11.7 (L) 13.0 - 17.7 g/dL Final   03/09/2021 12.5 (L) 13.0 - 17.7 g/dL Final   01/16/2016 13.5 (L) 13.7 - 17.6 g/dL Final     Platelets   Date Value Ref Range Status   03/25/2021 178 140 - 450 10*3/mm3 Final   03/09/2021 190 140 - 450 10*3/mm3 Final   01/16/2016 187 140 - 500 K/Cumm Final       Assessment/Plan   There are no diagnoses linked to this encounter.      Phil Gonsalves   *Right popliteal, peroneal, posterior tibial DVT, 2/9/2021, [facility]omas.    *Risk factors for clotting:  · On 2/15/2021, unremarkable: Protein C activity, protein S activity, prothrombin gene mutation, Antithrombin, fibrinogen, lupus anticoagulant, factor V Leiden gene mutation, beta-2 glycoprotein antibodies, anticardiolipin antibodies, protein S free  · Was sitting for 2 to 3 hours at a time.  States this was due to combination of wintertime and viral pandemic.  · No recent trips or surgeries or hospitalizations before the DVT  · Quit smoking 2005  · No family history or prior personal history of DVT or PE.    *Anticoagulation  · Xarelto  · His goal is to get off Xarelto after 6 months of anticoagulation.  · (Remains on aspirin 325 mg daily due to coronary stent)    *Anemia of chronic kidney disease stage III  · Unremarkable: Iron labs, B12, RBC folate, haptoglobin, bilirubin, LDH, direct Nini     *Cancer screening.  · Colonoscopy 2016.  States he was told next is due 10 years later.  · States he has not been having prostate cancer screening.  · I told him sometimes malignancies are associated with clotting.  I told him to discuss the pros and cons of prostate cancer screening with PCP    *Kidney dysfunction.  · Creatinine 1.39 on  3/9/2021.  · Creatinine recheck 1.3 on 3/12/2021.  · Defer further follow-up/work-up/treatment of this to PCP.  Patient states he has a history of mild elevation in creatinine.    *Persistent left-sided epistaxis since around early January 2021.  · Referral to ENT    Plan  · Referral to ENT  · Right leg Doppler early to mid August 2021 (after 6 months of anticoagulation).  MD 1 week later with CBC that day  · If at that visit the Doppler looks okay, plan to stop Xarelto and continue the aspirin 325 mg that he is already on  · Xarelto is considered reasonable if creatinine clearance is above 30.

## 2021-03-25 ENCOUNTER — OFFICE VISIT (OUTPATIENT)
Dept: ONCOLOGY | Facility: CLINIC | Age: 71
End: 2021-03-25

## 2021-03-25 ENCOUNTER — LAB (OUTPATIENT)
Dept: OTHER | Facility: HOSPITAL | Age: 71
End: 2021-03-25

## 2021-03-25 VITALS
TEMPERATURE: 97.7 F | HEIGHT: 68 IN | HEART RATE: 60 BPM | OXYGEN SATURATION: 94 % | BODY MASS INDEX: 28.96 KG/M2 | WEIGHT: 191.1 LBS | SYSTOLIC BLOOD PRESSURE: 112 MMHG | DIASTOLIC BLOOD PRESSURE: 71 MMHG | RESPIRATION RATE: 16 BRPM

## 2021-03-25 DIAGNOSIS — I82.431 ACUTE DEEP VEIN THROMBOSIS (DVT) OF POPLITEAL VEIN OF RIGHT LOWER EXTREMITY (HCC): Primary | ICD-10-CM

## 2021-03-25 DIAGNOSIS — D64.9 ANEMIA, UNSPECIFIED TYPE: ICD-10-CM

## 2021-03-25 DIAGNOSIS — R04.0 EPISTAXIS: ICD-10-CM

## 2021-03-25 LAB
BASOPHILS # BLD AUTO: 0.04 10*3/MM3 (ref 0–0.2)
BASOPHILS NFR BLD AUTO: 0.7 % (ref 0–1.5)
DEPRECATED RDW RBC AUTO: 46.5 FL (ref 37–54)
EOSINOPHIL # BLD AUTO: 0.2 10*3/MM3 (ref 0–0.4)
EOSINOPHIL NFR BLD AUTO: 3.3 % (ref 0.3–6.2)
ERYTHROCYTE [DISTWIDTH] IN BLOOD BY AUTOMATED COUNT: 13.7 % (ref 12.3–15.4)
HCT VFR BLD AUTO: 34.9 % (ref 37.5–51)
HGB BLD-MCNC: 11.7 G/DL (ref 13–17.7)
IMM GRANULOCYTES # BLD AUTO: 0.06 10*3/MM3 (ref 0–0.05)
IMM GRANULOCYTES NFR BLD AUTO: 1 % (ref 0–0.5)
LYMPHOCYTES # BLD AUTO: 1.88 10*3/MM3 (ref 0.7–3.1)
LYMPHOCYTES NFR BLD AUTO: 30.7 % (ref 19.6–45.3)
MCH RBC QN AUTO: 30.8 PG (ref 26.6–33)
MCHC RBC AUTO-ENTMCNC: 33.5 G/DL (ref 31.5–35.7)
MCV RBC AUTO: 91.8 FL (ref 79–97)
MONOCYTES # BLD AUTO: 0.69 10*3/MM3 (ref 0.1–0.9)
MONOCYTES NFR BLD AUTO: 11.3 % (ref 5–12)
NEUTROPHILS NFR BLD AUTO: 3.25 10*3/MM3 (ref 1.7–7)
NEUTROPHILS NFR BLD AUTO: 53 % (ref 42.7–76)
NRBC BLD AUTO-RTO: 0 /100 WBC (ref 0–0.2)
PLATELET # BLD AUTO: 178 10*3/MM3 (ref 140–450)
PMV BLD AUTO: 9.7 FL (ref 6–12)
RBC # BLD AUTO: 3.8 10*6/MM3 (ref 4.14–5.8)
WBC # BLD AUTO: 6.12 10*3/MM3 (ref 3.4–10.8)

## 2021-03-25 PROCEDURE — 85025 COMPLETE CBC W/AUTO DIFF WBC: CPT | Performed by: INTERNAL MEDICINE

## 2021-03-25 PROCEDURE — 36415 COLL VENOUS BLD VENIPUNCTURE: CPT

## 2021-03-25 PROCEDURE — 99214 OFFICE O/P EST MOD 30 MIN: CPT | Performed by: INTERNAL MEDICINE

## 2021-08-16 ENCOUNTER — HOSPITAL ENCOUNTER (OUTPATIENT)
Dept: CARDIOLOGY | Facility: HOSPITAL | Age: 71
End: 2021-08-16

## 2021-08-16 ENCOUNTER — TELEPHONE (OUTPATIENT)
Dept: ONCOLOGY | Facility: CLINIC | Age: 71
End: 2021-08-16

## 2021-08-16 NOTE — TELEPHONE ENCOUNTER
"Called the patient to let him know that we needed to reschedule his appt for his doppler that was missed. Patient stated he knew it was this month but \"lost his card.\" Patient stated he wanted to reschedule his doppler and Dr. Mckeon appt to reflect the change. I have messaged schedule to do so. I also asked for his appts to be mailed to him. Patient v/u.   "

## 2021-08-16 NOTE — TELEPHONE ENCOUNTER
Caller: Phil Gonsalves    Relationship to patient: Self    Best call back number: [phone]    Chief complaint: PATIENT MISSED DOPPLER STUDY TODAY AND NEEDS TO RESCHEDULE PRIOR TO HIS APPT WITH DR. MISHRA ON 8/26/21    Type of visit: DOPPLER STUDY    Requested date: ANYTIME BEFORE THE 26TH.     If rescheduling, when is the original appointment: 8/16/21    Additional notes: PLEASE CONTACT PATIENT TO ADVISE OF NEW APPT. DATE/TIME.  LEAVE VM IF NO ANSWER.

## 2021-08-17 ENCOUNTER — APPOINTMENT (OUTPATIENT)
Dept: OTHER | Facility: HOSPITAL | Age: 71
End: 2021-08-17

## 2021-08-20 ENCOUNTER — HOSPITAL ENCOUNTER (OUTPATIENT)
Dept: CARDIOLOGY | Facility: HOSPITAL | Age: 71
Discharge: HOME | End: 2021-08-20
Admitting: INTERNAL MEDICINE

## 2021-08-20 DIAGNOSIS — I82.431 ACUTE DEEP VEIN THROMBOSIS (DVT) OF POPLITEAL VEIN OF RIGHT LOWER EXTREMITY (HCC): ICD-10-CM

## 2021-08-20 PROCEDURE — 93971 EXTREMITY STUDY: CPT

## 2021-08-21 LAB
BH CV LOW VAS RIGHT POPLITEAL SPONT: 1
BH CV LOWER VASCULAR LEFT COMMON FEMORAL AUGMENT: NORMAL
BH CV LOWER VASCULAR LEFT COMMON FEMORAL COMPETENT: NORMAL
BH CV LOWER VASCULAR LEFT COMMON FEMORAL COMPRESS: NORMAL
BH CV LOWER VASCULAR LEFT COMMON FEMORAL PHASIC: NORMAL
BH CV LOWER VASCULAR LEFT COMMON FEMORAL SPONT: NORMAL
BH CV LOWER VASCULAR RIGHT COMMON FEMORAL AUGMENT: NORMAL
BH CV LOWER VASCULAR RIGHT COMMON FEMORAL COMPETENT: NORMAL
BH CV LOWER VASCULAR RIGHT COMMON FEMORAL COMPRESS: NORMAL
BH CV LOWER VASCULAR RIGHT COMMON FEMORAL PHASIC: NORMAL
BH CV LOWER VASCULAR RIGHT COMMON FEMORAL SPONT: NORMAL
BH CV LOWER VASCULAR RIGHT DISTAL FEMORAL COMPRESS: NORMAL
BH CV LOWER VASCULAR RIGHT GASTRONEMIUS COMPRESS: NORMAL
BH CV LOWER VASCULAR RIGHT GREATER SAPH AK COMPRESS: NORMAL
BH CV LOWER VASCULAR RIGHT GREATER SAPH BK COMPRESS: NORMAL
BH CV LOWER VASCULAR RIGHT LESSER SAPH COMPRESS: NORMAL
BH CV LOWER VASCULAR RIGHT MID FEMORAL AUGMENT: NORMAL
BH CV LOWER VASCULAR RIGHT MID FEMORAL COMPETENT: NORMAL
BH CV LOWER VASCULAR RIGHT MID FEMORAL COMPRESS: NORMAL
BH CV LOWER VASCULAR RIGHT MID FEMORAL PHASIC: NORMAL
BH CV LOWER VASCULAR RIGHT MID FEMORAL SPONT: NORMAL
BH CV LOWER VASCULAR RIGHT PERONEAL COMPRESS: NORMAL
BH CV LOWER VASCULAR RIGHT POPLITEAL AUGMENT: NORMAL
BH CV LOWER VASCULAR RIGHT POPLITEAL COMPETENT: NORMAL
BH CV LOWER VASCULAR RIGHT POPLITEAL COMPRESS: NORMAL
BH CV LOWER VASCULAR RIGHT POPLITEAL PHASIC: NORMAL
BH CV LOWER VASCULAR RIGHT POPLITEAL SPONT: NORMAL
BH CV LOWER VASCULAR RIGHT POPLITEAL THROMBUS: NORMAL
BH CV LOWER VASCULAR RIGHT POSTERIOR TIBIAL COMPRESS: NORMAL
BH CV LOWER VASCULAR RIGHT PROFUNDA FEMORAL COMPRESS: NORMAL
BH CV LOWER VASCULAR RIGHT PROXIMAL FEMORAL COMPRESS: NORMAL
BH CV LOWER VASCULAR RIGHT SAPHENOFEMORAL JUNCTION COMPRESS: NORMAL
MAXIMAL PREDICTED HEART RATE: 150 BPM
STRESS TARGET HR: 128 BPM

## 2021-08-23 NOTE — PROGRESS NOTES
.     REASON FOR FOLLOWUP :   PE and anemia    HISTORY OF PRESENT ILLNESS:  The patient is a 71 y.o. year old male  who is here for follow-up with the above-mentioned history.    No new issues since last visit.  Denies bleeding.  No complaints of chest pain or SOA.    Past Medical History:   Diagnosis Date   • Anxiety    • Chronic kidney disease    • Coronary artery disease    • Depression    • Headache    • History of anemia    • History of bradycardia    • Hyperglycemia    • Hyperlipidemia    • Hypertension    • Insomnia    • Malaise      Past Surgical History:   Procedure Laterality Date   • CARDIAC CATHETERIZATION     • CARDIAC SURGERY      PCI   • CAROTID STENT  2007    X 1       MEDICATIONS    Current Outpatient Medications:   •  ALPRAZolam (XANAX) 0.5 MG tablet, , Disp: , Rfl:   •  aspirin 325 MG EC tablet, Take 325 mg by mouth Daily., Disp: , Rfl:   •  atorvastatin (LIPITOR) 40 MG tablet, , Disp: , Rfl:   •  hydroCHLOROthiazide (HYDRODIURIL) 25 MG tablet, Take 25 mg by mouth Daily., Disp: , Rfl:   •  metoprolol tartrate (LOPRESSOR) 50 MG tablet, Take 50 tablets by mouth 2 (two) times a day., Disp: , Rfl:   •  pantoprazole (PROTONIX) 40 MG EC tablet, , Disp: , Rfl:   •  PARoxetine (PAXIL) 40 MG tablet, , Disp: , Rfl:   •  ramipril (ALTACE) 10 MG capsule, , Disp: , Rfl:   •  traZODone (DESYREL) 50 MG tablet, , Disp: , Rfl:   •  Xarelto 20 MG tablet, , Disp: , Rfl:     ALLERGIES:   No Known Allergies    SOCIAL HISTORY:       Social History     Socioeconomic History   • Marital status:      Spouse name: Sandra   • Number of children: Not on file   • Years of education: Not on file   • Highest education level: Not on file   Tobacco Use   • Smoking status: Former Smoker     Packs/day: 1.50     Years: 20.00     Pack years: 30.00     Types: Cigarettes     Quit date:      Years since quittin.6   • Smokeless tobacco: Never Used   Substance and Sexual Activity   • Alcohol use: Never   • Sexual  "activity: Defer         FAMILY HISTORY:  Family History   Problem Relation Age of Onset   • Heart attack Father    • Heart disease Father    • Hyperlipidemia Father    • Hypertension Father    • Heart disease Sister    • Hyperlipidemia Sister    • Hypertension Sister    • Heart disease Brother    • Hyperlipidemia Brother    • Hypertension Brother        REVIEW OF SYSTEMS:  Review of Systems   Constitutional: Negative for activity change.   HENT: Negative for trouble swallowing.    Respiratory: Negative for shortness of breath and wheezing.    Cardiovascular: Negative for chest pain and palpitations.   Gastrointestinal: Negative for constipation, diarrhea and nausea.   Genitourinary: Negative for dysuria and hematuria.   Musculoskeletal: Negative for arthralgias and myalgias.   Neurological: Negative for seizures and syncope.   Hematological: Negative for adenopathy. Does not bruise/bleed easily.   Psychiatric/Behavioral: Negative for confusion.              Vitals:    08/26/21 1355   BP: 94/60   Pulse: 60   Resp: 18   Temp: 97.3 °F (36.3 °C)   TempSrc: Temporal   SpO2: 96%   Weight: 88.2 kg (194 lb 6.4 oz)   Height: 173.5 cm (68.31\")   PainSc: 0-No pain     Current Status 8/26/2021   ECOG score 0      PHYSICAL EXAM:          CONSTITUTIONAL:  Vital signs reviewed.  No distress, looks comfortable.  EYES:  Conjunctiva and lids unremarkable.  PERRLA  EARS,NOSE,MOUTH,THROAT:  Ears and nose appear unremarkable.  Lips, teeth, gums appear unremarkable.  RESPIRATORY:  Normal respiratory effort.  Lungs clear to auscultation bilaterally.  CARDIOVASCULAR:  Normal S1, S2.  No murmurs rubs or gallops.  No significant lower extremity edema.  GASTROINTESTINAL: Abdomen appears unremarkable.  Nontender.  No hepatomegaly.  No splenomegaly.  LYMPHATIC:  No cervical, supraclavicular, axillary lymphadenopathy.  SKIN:  Warm.  No rashes.  PSYCHIATRIC:  Normal judgment and insight.  Normal mood and affect.       RECENT LABS:        WBC "   Date Value Ref Range Status   03/25/2021 6.12 3.40 - 10.80 10*3/mm3 Final   03/09/2021 6.65 3.40 - 10.80 10*3/mm3 Final   01/16/2016 5.95 4.50 - 10.70 K/Cumm Final     Hemoglobin   Date Value Ref Range Status   03/25/2021 11.7 (L) 13.0 - 17.7 g/dL Final   03/09/2021 12.5 (L) 13.0 - 17.7 g/dL Final   01/16/2016 13.5 (L) 13.7 - 17.6 g/dL Final     Platelets   Date Value Ref Range Status   03/25/2021 178 140 - 450 10*3/mm3 Final   03/09/2021 190 140 - 450 10*3/mm3 Final   01/16/2016 187 140 - 500 K/Cumm Final       Assessment/Plan   Acute deep vein thrombosis (DVT) of popliteal vein of right lower extremity (CMS/HCC)        Phil Gonsalves   *Right popliteal, peroneal, posterior tibial DVT, 2/9/2021, [facility].  · 8/20/2021: Chronic right popliteal DVT.  Veins otherwise normal.  · With the improvement in DVT, it is reasonable to stop Xarelto.  (He was on aspirin 325 mg prior to starting Xarelto due to a coronary stent.  On 8/26/2021, patient states he self stopped the aspirin when he began Xarelto.  He was instructed to restart the aspirin for his cardiac reasons.  He was also told aspirin slightly decreases the risk of having a second clotting event after completing anticoagulation.)    *Risk factors for clotting:  · On 2/15/2021, unremarkable: Protein C activity, protein S activity, prothrombin gene mutation, Antithrombin, fibrinogen, lupus anticoagulant, factor V Leiden gene mutation, beta-2 glycoprotein antibodies, anticardiolipin antibodies, protein S free  · Was sitting for 2 to 3 hours at a time.  States this was due to combination of wintertime and viral pandemic.  · No recent trips or surgeries or hospitalizations before the DVT  · Quit smoking 2005  · No family history or prior personal history of DVT or PE.    *Anemia of chronic kidney disease stage III  · Unremarkable: Iron labs, B12, RBC folate, haptoglobin, bilirubin, LDH, direct Nini     *Cancer screening.  · Colonoscopy 2016.  States he was told next  is due 10 years later.  · States he has not been having prostate cancer screening.  · I told him sometimes malignancies are associated with clotting.  I told him to discuss the pros and cons of prostate cancer screening with PCP    *Kidney dysfunction.  · Creatinine 1.39 on 3/9/2021.  · Creatinine recheck 1.3 on 3/12/2021.  · Defer further follow-up/work-up/treatment of this to PCP.  Patient states he has a history of mild elevation in creatinine.    *Persistent left-sided epistaxis since around early January 2021.  · Evaluated by Dr. Hoang, 5/26/2021.  He recommended intranasal Vaseline and asked him to see the audiologist for hearing aid evaluation.    Plan  · Stop Xarelto.  Restart aspirin  · No follow-up  · Avoid immobility

## 2021-08-26 ENCOUNTER — OFFICE VISIT (OUTPATIENT)
Dept: ONCOLOGY | Facility: CLINIC | Age: 71
End: 2021-08-26

## 2021-08-26 ENCOUNTER — LAB (OUTPATIENT)
Dept: OTHER | Facility: HOSPITAL | Age: 71
End: 2021-08-26

## 2021-08-26 VITALS
WEIGHT: 194.4 LBS | OXYGEN SATURATION: 96 % | DIASTOLIC BLOOD PRESSURE: 60 MMHG | HEART RATE: 60 BPM | SYSTOLIC BLOOD PRESSURE: 94 MMHG | RESPIRATION RATE: 18 BRPM | BODY MASS INDEX: 29.46 KG/M2 | TEMPERATURE: 97.3 F | HEIGHT: 68 IN

## 2021-08-26 DIAGNOSIS — I82.431 ACUTE DEEP VEIN THROMBOSIS (DVT) OF POPLITEAL VEIN OF RIGHT LOWER EXTREMITY (HCC): Primary | ICD-10-CM

## 2021-08-26 DIAGNOSIS — I82.431 ACUTE DEEP VEIN THROMBOSIS (DVT) OF POPLITEAL VEIN OF RIGHT LOWER EXTREMITY (HCC): ICD-10-CM

## 2021-08-26 LAB
BASOPHILS # BLD AUTO: 0.03 10*3/MM3 (ref 0–0.2)
BASOPHILS NFR BLD AUTO: 0.5 % (ref 0–1.5)
DEPRECATED RDW RBC AUTO: 45.3 FL (ref 37–54)
EOSINOPHIL # BLD AUTO: 0.1 10*3/MM3 (ref 0–0.4)
EOSINOPHIL NFR BLD AUTO: 1.6 % (ref 0.3–6.2)
ERYTHROCYTE [DISTWIDTH] IN BLOOD BY AUTOMATED COUNT: 14.1 % (ref 12.3–15.4)
HCT VFR BLD AUTO: 38.5 % (ref 37.5–51)
HGB BLD-MCNC: 12.3 G/DL (ref 13–17.7)
IMM GRANULOCYTES # BLD AUTO: 0.02 10*3/MM3 (ref 0–0.05)
IMM GRANULOCYTES NFR BLD AUTO: 0.3 % (ref 0–0.5)
LYMPHOCYTES # BLD AUTO: 1.94 10*3/MM3 (ref 0.7–3.1)
LYMPHOCYTES NFR BLD AUTO: 30.9 % (ref 19.6–45.3)
MCH RBC QN AUTO: 28 PG (ref 26.6–33)
MCHC RBC AUTO-ENTMCNC: 31.9 G/DL (ref 31.5–35.7)
MCV RBC AUTO: 87.7 FL (ref 79–97)
MONOCYTES # BLD AUTO: 0.82 10*3/MM3 (ref 0.1–0.9)
MONOCYTES NFR BLD AUTO: 13.1 % (ref 5–12)
NEUTROPHILS NFR BLD AUTO: 3.36 10*3/MM3 (ref 1.7–7)
NEUTROPHILS NFR BLD AUTO: 53.6 % (ref 42.7–76)
NRBC BLD AUTO-RTO: 0 /100 WBC (ref 0–0.2)
PLATELET # BLD AUTO: 197 10*3/MM3 (ref 140–450)
PMV BLD AUTO: 10.2 FL (ref 6–12)
RBC # BLD AUTO: 4.39 10*6/MM3 (ref 4.14–5.8)
WBC # BLD AUTO: 6.27 10*3/MM3 (ref 3.4–10.8)

## 2021-08-26 PROCEDURE — 36415 COLL VENOUS BLD VENIPUNCTURE: CPT

## 2021-08-26 PROCEDURE — 99213 OFFICE O/P EST LOW 20 MIN: CPT | Performed by: INTERNAL MEDICINE

## 2021-08-26 PROCEDURE — 85025 COMPLETE CBC W/AUTO DIFF WBC: CPT | Performed by: INTERNAL MEDICINE

## 2022-10-01 ENCOUNTER — APPOINTMENT (OUTPATIENT)
Dept: GENERAL RADIOLOGY | Facility: HOSPITAL | Age: 72
End: 2022-10-01

## 2022-10-01 ENCOUNTER — APPOINTMENT (OUTPATIENT)
Dept: CARDIOLOGY | Facility: HOSPITAL | Age: 72
End: 2022-10-01

## 2022-10-01 ENCOUNTER — HOSPITAL ENCOUNTER (EMERGENCY)
Facility: HOSPITAL | Age: 72
Discharge: HOME | End: 2022-10-01
Attending: EMERGENCY MEDICINE | Admitting: EMERGENCY MEDICINE

## 2022-10-01 VITALS
DIASTOLIC BLOOD PRESSURE: 91 MMHG | WEIGHT: 175 LBS | TEMPERATURE: 99 F | RESPIRATION RATE: 16 BRPM | HEART RATE: 65 BPM | BODY MASS INDEX: 25.05 KG/M2 | SYSTOLIC BLOOD PRESSURE: 135 MMHG | OXYGEN SATURATION: 98 % | HEIGHT: 70 IN

## 2022-10-01 DIAGNOSIS — M47.26 OSTEOARTHRITIS OF SPINE WITH RADICULOPATHY, LUMBAR REGION: ICD-10-CM

## 2022-10-01 DIAGNOSIS — Z86.718 HISTORY OF DVT (DEEP VEIN THROMBOSIS): ICD-10-CM

## 2022-10-01 DIAGNOSIS — M54.41 ACUTE RIGHT-SIDED LOW BACK PAIN WITH RIGHT-SIDED SCIATICA: Primary | ICD-10-CM

## 2022-10-01 LAB
BH CV LOWER VASCULAR LEFT COMMON FEMORAL AUGMENT: NORMAL
BH CV LOWER VASCULAR LEFT COMMON FEMORAL COMPETENT: NORMAL
BH CV LOWER VASCULAR LEFT COMMON FEMORAL COMPRESS: NORMAL
BH CV LOWER VASCULAR LEFT COMMON FEMORAL PHASIC: NORMAL
BH CV LOWER VASCULAR LEFT COMMON FEMORAL SPONT: NORMAL
BH CV LOWER VASCULAR RIGHT COMMON FEMORAL AUGMENT: NORMAL
BH CV LOWER VASCULAR RIGHT COMMON FEMORAL COMPETENT: NORMAL
BH CV LOWER VASCULAR RIGHT COMMON FEMORAL COMPRESS: NORMAL
BH CV LOWER VASCULAR RIGHT COMMON FEMORAL PHASIC: NORMAL
BH CV LOWER VASCULAR RIGHT COMMON FEMORAL SPONT: NORMAL
BH CV LOWER VASCULAR RIGHT DISTAL FEMORAL COMPRESS: NORMAL
BH CV LOWER VASCULAR RIGHT GASTRONEMIUS COMPRESS: NORMAL
BH CV LOWER VASCULAR RIGHT GREATER SAPH AK COMPRESS: NORMAL
BH CV LOWER VASCULAR RIGHT GREATER SAPH BK COMPRESS: NORMAL
BH CV LOWER VASCULAR RIGHT LESSER SAPH COMPRESS: NORMAL
BH CV LOWER VASCULAR RIGHT MID FEMORAL AUGMENT: NORMAL
BH CV LOWER VASCULAR RIGHT MID FEMORAL COMPETENT: NORMAL
BH CV LOWER VASCULAR RIGHT MID FEMORAL COMPRESS: NORMAL
BH CV LOWER VASCULAR RIGHT MID FEMORAL PHASIC: NORMAL
BH CV LOWER VASCULAR RIGHT MID FEMORAL SPONT: NORMAL
BH CV LOWER VASCULAR RIGHT PERONEAL COMPRESS: NORMAL
BH CV LOWER VASCULAR RIGHT POPLITEAL AUGMENT: NORMAL
BH CV LOWER VASCULAR RIGHT POPLITEAL COMPETENT: NORMAL
BH CV LOWER VASCULAR RIGHT POPLITEAL COMPRESS: NORMAL
BH CV LOWER VASCULAR RIGHT POPLITEAL PHASIC: NORMAL
BH CV LOWER VASCULAR RIGHT POPLITEAL SPONT: NORMAL
BH CV LOWER VASCULAR RIGHT POSTERIOR TIBIAL COMPRESS: NORMAL
BH CV LOWER VASCULAR RIGHT PROFUNDA FEMORAL COMPRESS: NORMAL
BH CV LOWER VASCULAR RIGHT PROXIMAL FEMORAL COMPRESS: NORMAL
BH CV LOWER VASCULAR RIGHT SAPHENOFEMORAL JUNCTION COMPRESS: NORMAL
BH CV LOWER VASCULAR RIGHT SOLEAL COMPRESS: NORMAL
MAXIMAL PREDICTED HEART RATE: 148 BPM
STRESS TARGET HR: 126 BPM

## 2022-10-01 PROCEDURE — 72110 X-RAY EXAM L-2 SPINE 4/>VWS: CPT

## 2022-10-01 PROCEDURE — 93971 EXTREMITY STUDY: CPT

## 2022-10-01 PROCEDURE — 99283 EMERGENCY DEPT VISIT LOW MDM: CPT

## 2022-10-01 PROCEDURE — 73502 X-RAY EXAM HIP UNI 2-3 VIEWS: CPT

## 2022-10-01 RX ORDER — HYDROCODONE BITARTRATE AND ACETAMINOPHEN 5; 325 MG/1; MG/1
1 TABLET ORAL ONCE
Status: COMPLETED | OUTPATIENT
Start: 2022-10-01 | End: 2022-10-01

## 2022-10-01 RX ORDER — METHYLPREDNISOLONE 4 MG/1
TABLET ORAL
Qty: 1 EACH | Refills: 0 | Status: SHIPPED | OUTPATIENT
Start: 2022-10-01

## 2022-10-01 RX ADMIN — HYDROCODONE BITARTRATE AND ACETAMINOPHEN 1 TABLET: 5; 325 TABLET ORAL at 15:09

## 2022-10-01 NOTE — ED TRIAGE NOTES
Pt reports right hip pain that radiates down his right leg that started Wednesday.     Pt was wearing a mask during assessment.  This RN wore appropriate PPE

## 2022-10-01 NOTE — PROGRESS NOTES
Today's right lower venous doppler preliminary results are negative for deep vein thrombosis in the right leg. This preliminary results were given to Dr. Paige in the ER.

## 2022-10-01 NOTE — ED PROVIDER NOTES
EMERGENCY DEPARTMENT ENCOUNTER    Room Number:  05/05  Date seen:  10/2/2022  Time seen: 14:39 EDT  PCP: Joel Hanson MD  Historian: patient      HPI:  Chief Complaint: right leg pain    A complete HPI/ROS/PMH/PSH/SH/FH are unobtainable due to: none    Context: Phil Gonsalves is a 72 y.o. male who presents to the ED for evaluation of 3-day history of right leg pain that is diffuse constant and nothing makes it worse or better.  He states that any position for too long seems to exacerbate it but he does not really get relief in any specific position.  Range of motion and weightbearing also do not seem to affect it.  He has a history of DVT in the right lower extremity, he completed his course of anticoagulation and now is on aspirin only.  He denies any back pain lower extremity weakness saddle paresthesias bowel or bladder dysfunction fall trauma or injury.  Also denies any fever chills or urinary symptoms.        PAST MEDICAL HISTORY  Active Ambulatory Problems     Diagnosis Date Noted   • Acute deep vein thrombosis (DVT) of popliteal vein of right lower extremity (HCC) 03/09/2021   • Anemia 03/09/2021     Resolved Ambulatory Problems     Diagnosis Date Noted   • No Resolved Ambulatory Problems     Past Medical History:   Diagnosis Date   • Anxiety    • Chronic kidney disease    • Coronary artery disease    • Depression    • Headache    • History of anemia    • History of bradycardia    • Hyperglycemia    • Hyperlipidemia    • Hypertension    • Insomnia    • Malaise          PAST SURGICAL HISTORY  Past Surgical History:   Procedure Laterality Date   • CARDIAC CATHETERIZATION     • CARDIAC SURGERY  2005    PCI   • CAROTID STENT  2007    X 1         FAMILY HISTORY  Family History   Problem Relation Age of Onset   • Heart attack Father    • Heart disease Father    • Hyperlipidemia Father    • Hypertension Father    • Heart disease Sister    • Hyperlipidemia Sister    • Hypertension Sister    • Heart  disease Brother    • Hyperlipidemia Brother    • Hypertension Brother          SOCIAL HISTORY  Social History     Socioeconomic History   • Marital status:      Spouse name: Sandra   Tobacco Use   • Smoking status: Former Smoker     Packs/day: 1.50     Years: 20.00     Pack years: 30.00     Types: Cigarettes     Quit date:      Years since quittin.7   • Smokeless tobacco: Never Used   Substance and Sexual Activity   • Alcohol use: Never   • Sexual activity: Defer         ALLERGIES  Patient has no known allergies.        REVIEW OF SYSTEMS  Review of Systems     All systems reviewed and negative except for those discussed in HPI.       PHYSICAL EXAM  ED Triage Vitals   Temp Heart Rate Resp BP SpO2   10/01/22 1327 10/01/22 1327 10/01/22 1327 10/01/22 1329 10/01/22 1327   99 °F (37.2 °C) 65 16 146/84 97 %      Temp src Heart Rate Source Patient Position BP Location FiO2 (%)   10/01/22 1327 10/01/22 1327 10/01/22 1329 10/01/22 1329 --   Tympanic Monitor Standing Right arm          GENERAL: not distressed  HENT: atraumatic  EYES: no scleral icterus  CV: regular rhythm, regular rate  RESPIRATORY: normal effort, CTA B  ABDOMEN: soft, nontender nondistended  MUSCULOSKELETAL: no deformity.  No midline C, T, L-spine tenderness.  He does have some mild tenderness to the right lumbosacral paraspinal musculature, no other significant tenderness.  Bilateral lower extremities are unremarkable in appearance without any edema or erythema. Sensation is intact to light touch throughout the bilateral lower extremities. Muscle strength is 5/5 and symmetrical with plantarflexion and EHL. Patellar reflexes are 2+ and equal bilaterally. DP and PT pulses are 2+ bilaterally.   He is able to ambulate.  NEURO: alert, moves all extremities, follows commands  SKIN: warm, dry    Vital signs and nursing notes reviewed.          LAB RESULTS  Recent Results (from the past 24 hour(s))   Duplex Venous Lower Extremity - Right     Collection Time: 10/01/22  3:25 PM   Result Value Ref Range    Target HR (85%) 126 bpm    Max. Pred. HR (100%) 148 bpm    Right Common Femoral Spont Y     Right Common Femoral Phasic Y     Right Common Femoral Augment Y     Right Common Femoral Competent Y     Right Common Femoral Compress C     Right Saphenofemoral Junction Compress C     Right Profunda Femoral Compress C     Right Proximal Femoral Compress C     Right Mid Femoral Spont Y     Right Mid Femoral Phasic Y     Right Mid Femoral Augment Y     Right Mid Femoral Competent Y     Right Mid Femoral Compress C     Right Distal Femoral Compress C     Right Popliteal Spont D     Right Popliteal Phasic D     Right Popliteal Augment Y     Right Popliteal Competent Y     Right Popliteal Compress C     Right Posterior Tibial Compress C     Right Peroneal Compress C     Right Gastronemius Compress C     Right Greater Saph AK Compress C     Right Greater Saph BK Compress C     Right Lesser Saph Compress C     Left Common Femoral Spont Y     Left Common Femoral Phasic Y     Left Common Femoral Augment Y     Left Common Femoral Competent Y     Left Common Femoral Compress C     BH CV LOWER VASCULAR RIGHT SOLEAL COMPRESS C        Ordered the above labs and independently reviewed the results.        RADIOLOGY  XR Hip With or Without Pelvis 2 - 3 View Right   Final Result   No acute findings       This report was finalized on 10/1/2022 4:04 PM by Dr. Gonzalo Bueno M.D.          XR Spine Lumbar Complete 4+VW   Final Result   Degenerative changes as described       This report was finalized on 10/1/2022 4:02 PM by Dr. Gonzalo Bueno M.D.              I ordered the above noted radiological studies. Reviewed by me and discussed with radiologist.  See dictation for official radiology interpretation.    PROCEDURES  Procedures        MEDICATIONS GIVEN IN ER  Medications   HYDROcodone-acetaminophen (NORCO) 5-325 MG per tablet 1 tablet (1 tablet Oral Given 10/1/22 9710)            PROGRESS AND CONSULTS    DDX includes but not limited to DVT, sciatica, radiculopathy, neuropathy    ED Course as of 10/02/22 1416   Sat Oct 01, 2022   1544 I received a call from the vascular lab who states that the patient's venous Doppler is negative for DVT [GP]   5613 I reassessed the patient, he is resting, did get some improvement with Norco administered in the emergency department.  Pain is mild to moderate.  Right lower extremity Doppler negative for DVT.  Right hip and lumbar spine films show some degenerative changes, no acute findings.  Symptoms consistent with radicular/sciatica pain. I will prescribe him a Medrol Dosepak and have him follow-up with his PCP.  May need physical therapy or further outpatient treatment or additional medications.  He has no evidence of any neurologic deficit or red flags, is ambulatory with a steady gait independently and stable for discharge.   [KA]      ED Course User Index  [GP] oJvani Paige MD  [KA] Gladys Morton PA             Patient was placed in face mask in first look. Patient was wearing facemask each time I entered the room and throughout our encounter. I wore protective equipment throughout this patient encounter including a face mask, eye shield and gloves. Hand hygiene was performed before donning protective equipment and after removal when leaving the room.        DIAGNOSIS  Final diagnoses:   Acute right-sided low back pain with right-sided sciatica   Osteoarthritis of spine with radiculopathy, lumbar region   History of DVT (deep vein thrombosis)         Follow Up:  Joel Hanson MD  [address] [facility] [id number] [phone]    In 3 days        RX:     Medication List      New Prescriptions    methylPREDNISolone 4 MG dose pack  Commonly known as: MEDROL  Take as directed on package instructions.           Where to Get Your Medications      These medications were sent to [facility] [id number] - [place], [place] -  [address] - [phone] PH - [phone] FX  [address], [facility] [address]    Phone: [phone]   · methylPREDNISolone 4 MG dose pack           Latest Documented Vital Signs:  As of 14:16 EDT  BP- 135/91 HR- 65 Temp- 99 °F (37.2 °C) (Tympanic) O2 sat- 98%       Gladys Morton PA  10/02/22 1416

## 2022-10-01 NOTE — DISCHARGE INSTRUCTIONS
Gentle activities as tolerated  You can take Tylenol 1000 mg every 8 hours as needed for pain  Recheck with your PCP next week

## 2022-10-01 NOTE — ED PROVIDER NOTES
MD ATTESTATION NOTE    The CLARISSA and I have discussed this patient's history, physical exam, and treatment plan.  I have reviewed the documentation and personally had a face to face interaction with the patient. I affirm the documentation and agree with the treatment and plan.  The attached note describes my personal findings.      I provided a substantive portion of the care of the patient.  I personally performed the physical exam in its entirety, and below are my findings.  For this patient encounter, the patient wore surgical mask, I wore full protective PPE including N95 and eye protection    Brief HPI: 72-year-old male that presents to emergency room today complaining of right hip pain that radiates down his right leg that started 3 days ago after carrying a leaf blower up and down a set of steps..  Patient states he does have a history of DVT in that leg and currently is on a full-strength aspirin daily.  He states this does not feel like his prior DVT.  Patient states she also has a history of sciatica with 1 episode in the past and it feels more like this.    General : 72-year-old patient is awake alert and oriented  HEENT: NCAT  CV: Heart is regular with no murmurs  Respiratory: CTA bilaterally  Abd: Soft and nontender  Back: Tenderness over his right SI joint  Ext: No acute abnormalities with full range of motion of his hip with minimal pain  Skin: No rash  Neuro: Cranial nerves II through XII grossly intact as tested.  No acute lateralizing deficits.  5/5 strength in bilateral lower extremities with positive straight leg raise test on the right at 20 degrees  Psych: Normal mood and affect      Plan: We will perform a right lower extremity Doppler, L-spine films and right hip/pelvis films for further evaluation.  The patient's Doppler and right hip are negative.  Patient's L-spine film showed DJD.  I believe the patient has a lumbar radiculopathy and he is stable for discharge home on a Medrol Dosepak with  outpatient follow-up.     Jovani Paige MD  10/01/22 2507